# Patient Record
Sex: FEMALE | Race: WHITE
[De-identification: names, ages, dates, MRNs, and addresses within clinical notes are randomized per-mention and may not be internally consistent; named-entity substitution may affect disease eponyms.]

---

## 2017-09-21 ENCOUNTER — HOSPITAL ENCOUNTER (EMERGENCY)
Dept: HOSPITAL 61 - ER | Age: 22
Discharge: HOME | End: 2017-09-21
Payer: COMMERCIAL

## 2017-09-21 VITALS — DIASTOLIC BLOOD PRESSURE: 68 MMHG | SYSTOLIC BLOOD PRESSURE: 129 MMHG

## 2017-09-21 VITALS — WEIGHT: 140 LBS | BODY MASS INDEX: 18.96 KG/M2 | HEIGHT: 72 IN

## 2017-09-21 DIAGNOSIS — J45.909: ICD-10-CM

## 2017-09-21 DIAGNOSIS — N39.0: Primary | ICD-10-CM

## 2017-09-21 DIAGNOSIS — R05: ICD-10-CM

## 2017-09-21 DIAGNOSIS — E28.2: ICD-10-CM

## 2017-09-21 DIAGNOSIS — F17.210: ICD-10-CM

## 2017-09-21 LAB
BACTERIA #/AREA URNS HPF: (no result) /HPF
BILIRUB UR QL STRIP: NEGATIVE
GLUCOSE UR STRIP-MCNC: NEGATIVE MG/DL
NITRITE UR QL STRIP: NEGATIVE
PH UR STRIP: 6 [PH]
PROT UR STRIP-MCNC: 100 MG/DL
RBC #/AREA URNS HPF: (no result) /HPF (ref 0–2)
SP GR UR STRIP: 1.01
SQUAMOUS #/AREA URNS LPF: (no result) /LPF
UROBILINOGEN UR-MCNC: 0.2 MG/DL
WBC #/AREA URNS HPF: (no result) /HPF (ref 0–4)

## 2017-09-21 PROCEDURE — 81001 URINALYSIS AUTO W/SCOPE: CPT

## 2017-09-21 PROCEDURE — 81025 URINE PREGNANCY TEST: CPT

## 2017-09-21 PROCEDURE — 99284 EMERGENCY DEPT VISIT MOD MDM: CPT

## 2017-09-21 PROCEDURE — 87186 SC STD MICRODIL/AGAR DIL: CPT

## 2017-09-21 PROCEDURE — 87086 URINE CULTURE/COLONY COUNT: CPT

## 2017-09-21 NOTE — PHYS DOC
Past Medical History


Past Medical History:  Asthma, Other


Additional Past Medical Histor:  PCOS


Past Surgical History:  No Surgical History


Alcohol Use:  Rarely


Drug Use:  Marijuana


Social History Narrative:  [employer] YESTERDAY





Adult General


Chief Complaint


Chief Complaint:  ABDOMINAL PAIN





HPI


HPI





Patient is a 22  year old female who presents with here to 3 day history of 

gradual onset mild to moderate severity urinary frequency dysuria occasional 

back pain lower no flank pain; no fever vomiting or diarrhea; slight nausea; 

last menstrual period less than 4 weeks ago. Denies vaginal bleeding or 

discharge. Denies chest pain or shortness of breath; slight chronic cough 

secondary to cigarette smoking.





Review of Systems


Review of Systems





Constitutional: Denies fever or chills []


Eyes: Denies change in visual acuity, redness, or eye pain []


HENT: Denies nasal congestion or sore throat []


Respiratory: Denies cough or shortness of breath []


Cardiovascular: No additional information not addressed in HPI []


GI: Denies abdominal pain, nausea, vomiting, bloody stools or diarrhea []


: Denies dysuria or hematuria []


Musculoskeletal: Denies back pain or joint pain []


Integument: Denies rash or skin lesions []


Neurologic: Denies headache, focal weakness or sensory changes []


Endocrine: Denies polyuria or polydipsia []





Allergies


Allergies





Allergies








Coded Allergies Type Severity Reaction Last Updated Verified


 


  No Known Drug Allergies    1/18/16 No











Physical Exam


Physical Exam





Constitutional: Well developed, well nourished, no acute distress, non-toxic 

appearance. []


HENT: Normocephalic, atraumatic, bilateral external ears normal, oropharynx 

moist, no oral exudates, nose normal. []


Eyes: PERRLA, EOMI, conjunctiva normal, no discharge. [] 


Neck: Normal range of motion, no tenderness, supple, no stridor. [] 


Cardiovascular:Heart rate regular rhythm, no murmur []


Lungs & Thorax:  Bilateral breath sounds clear to auscultation []


Abdomen: Bowel sounds normal, soft, no tenderness, no masses, no pulsatile 

masses. [] 


Skin: Warm, dry, no erythema, no rash. [] 


Back: No tenderness, no CVA tenderness. [] 


Extremities: No tenderness, no cyanosis, no clubbing, ROM intact, no edema. [] 


Neurologic: Alert and oriented X 3, normal motor function, normal sensory 

function, no focal deficits noted. []


Psychologic: Affect normal, judgement normal, mood normal. []





Current Patient Data


Vital Signs





 Vital Signs








  Date Time  Temp Pulse Resp B/P (MAP) Pulse Ox O2 Delivery O2 Flow Rate FiO2


 


9/21/17 06:46 98.9 105 16 129/68 (88) 98 Room Air  





 98.9       








Lab Values





 Laboratory Tests








Test


  9/21/17


07:19 9/21/17


07:21


 


Urine Collection Type Void   


 


Urine Color Yellow   


 


Urine Clarity Cloudy   


 


Urine pH 6.0   


 


Urine Specific Gravity 1.015   


 


Urine Protein


  100 mg/dL


(NEG-TRACE) 


 


 


Urine Glucose (UA)


  Negative mg/dL


(NEG) 


 


 


Urine Ketones (Stick)


  Negative mg/dL


(NEG) 


 


 


Urine Blood


  Moderate (NEG)


  


 


 


Urine Nitrite


  Negative (NEG)


  


 


 


Urine Bilirubin


  Negative (NEG)


  


 


 


Urine Urobilinogen Dipstick


  0.2 mg/dL (0.2


mg/dL) 


 


 


Urine Leukocyte Esterase Large (NEG)   


 


Urine RBC


  Occ /HPF (0-2)


  


 


 


Urine WBC


  Tntc /HPF


(0-4) 


 


 


Urine Squamous Epithelial


Cells Mod /LPF  


  


 


 


Urine Bacteria


  Few /HPF


(0-FEW) 


 


 


Urine Mucus Slight /LPF   


 


POC Urine HCG, Qualitative


  


  Hcg negative


(Negative)











EKG


EKG


[]





Radiology/Procedures


Radiology/Procedures


[]





Course & Med Decision Making


Course & Med Decision Making


Pertinent Labs and Imaging studies reviewed. (See chart for details)





[]Not pregnant. UA consistent with UTI we'll treat accordingly.





Dragon Disclaimer


Dragon Disclaimer


This electronic medical record was generated, in whole or in part, using a 

voice recognition dictation system.





Departure


Departure


Impression:  


 Primary Impression:  


 UTI (urinary tract infection)


Disposition:  01 HOME, SELF-CARE


Condition:  STABLE


Referrals:  


NO PCP (PCP)


Patient Instructions:  Urinary Tract Infection, Easy-to-Read


Scripts


Nitrofurantoin Monohyd/M-Cryst (MACROBID 100 MG CAPSULE) 100 Mg Capsule


1 CAP PO BID for 7 Days, #14 CAP


   Prov: SHALINI GLOVER MD         9/21/17











SHALINI GLOVER MD Sep 21, 2017 06:57

## 2019-07-04 ENCOUNTER — HOSPITAL ENCOUNTER (EMERGENCY)
Dept: HOSPITAL 61 - ER | Age: 24
Discharge: HOME | End: 2019-07-04
Payer: COMMERCIAL

## 2019-07-04 VITALS — HEIGHT: 66 IN | WEIGHT: 140 LBS | BODY MASS INDEX: 22.5 KG/M2

## 2019-07-04 VITALS — DIASTOLIC BLOOD PRESSURE: 54 MMHG | SYSTOLIC BLOOD PRESSURE: 109 MMHG

## 2019-07-04 DIAGNOSIS — S02.2XXA: Primary | ICD-10-CM

## 2019-07-04 DIAGNOSIS — S09.90XA: ICD-10-CM

## 2019-07-04 DIAGNOSIS — F41.9: ICD-10-CM
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
## 2019-07-04 DIAGNOSIS — J45.909: ICD-10-CM
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
## 2019-07-04 DIAGNOSIS — F10.20: ICD-10-CM

## 2019-07-04 PROCEDURE — 96374 THER/PROPH/DIAG INJ IV PUSH: CPT

## 2019-07-04 PROCEDURE — 70450 CT HEAD/BRAIN W/O DYE: CPT

## 2019-07-04 PROCEDURE — 70486 CT MAXILLOFACIAL W/O DYE: CPT

## 2019-07-04 PROCEDURE — 72125 CT NECK SPINE W/O DYE: CPT

## 2019-07-04 PROCEDURE — 96375 TX/PRO/DX INJ NEW DRUG ADDON: CPT

## 2019-07-04 PROCEDURE — 99284 EMERGENCY DEPT VISIT MOD MDM: CPT

## 2019-07-04 PROCEDURE — 81025 URINE PREGNANCY TEST: CPT
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
## 2019-11-23 ENCOUNTER — HOSPITAL ENCOUNTER (EMERGENCY)
Dept: HOSPITAL 61 - ER | Age: 24
Discharge: LEFT BEFORE DISCHARGE | End: 2019-11-23
Payer: COMMERCIAL

## 2019-11-23 VITALS — BODY MASS INDEX: 21.56 KG/M2 | HEIGHT: 71 IN | WEIGHT: 154 LBS

## 2019-11-23 VITALS — DIASTOLIC BLOOD PRESSURE: 69 MMHG | SYSTOLIC BLOOD PRESSURE: 109 MMHG
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
## 2019-11-23 DIAGNOSIS — J45.909: ICD-10-CM
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
## 2019-11-23 DIAGNOSIS — F17.200: ICD-10-CM

## 2019-11-23 DIAGNOSIS — F10.20: ICD-10-CM

## 2019-11-23 DIAGNOSIS — S01.111A: Primary | ICD-10-CM
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
## 2019-11-23 PROCEDURE — 99284 EMERGENCY DEPT VISIT MOD MDM: CPT
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
